# Patient Record
(demographics unavailable — no encounter records)

---

## 2024-11-12 NOTE — PHYSICAL EXAM
[Chaperone Present] : A chaperone was present in the examining room during all aspects of the physical examination [78501] : A chaperone was present during the pelvic exam. [FreeTextEntry2] : Lakshmi [Appropriately responsive] : appropriately responsive [Alert] : alert [No Acute Distress] : no acute distress [No Lymphadenopathy] : no lymphadenopathy [Regular Rate Rhythm] : regular rate rhythm [No Murmurs] : no murmurs [Clear to Auscultation B/L] : clear to auscultation bilaterally [Soft] : soft [Non-tender] : non-tender [Non-distended] : non-distended [No HSM] : No HSM [No Lesions] : no lesions [No Mass] : no mass [Oriented x3] : oriented x3 [FreeTextEntry6] : No masses, nontender, no skin changes, no nipple discharge, no adenopathy. [Examination Of The Breasts] : a normal appearance [No Masses] : no breast masses were palpable [Labia Majora] : normal [Labia Minora] : normal [Normal] : normal [Tenderness] : nontender [Anteversion] : anteverted [Mass ___ cm] : no uterine mass was palpated [Uterine Adnexae] : normal

## 2024-11-12 NOTE — HISTORY OF PRESENT ILLNESS
[FreeTextEntry1] : Patient is a 48-year-old  0 last menstrual period 2024 Patient presents for annual visit,, denies any complaints

## 2024-11-12 NOTE — PLAN
[FreeTextEntry1] : Patient is a 48-year-old  0 last menstrual period 2024 Patient presents for annual visit,, denies any complaints Physical exam reveals a well-developed well-nourished female no apparent distress,, BMI 26 Heart regular rhythm and rate, lungs clear, breast no mass nontender no skin change or nipple discharge adenopathy, abdomen soft nontender no organomegaly Pelvic exam shows normal female external genitalia, vagina no lesions, cervix appropriate size nontender, uterus anteverted normal size nontender, adnexa no mass nontender Pap smear was performed Patient will receive a prescription for breast mammogram and sonogram Essentially benign GYN exam Follow-up 1 year or prior to that as needed  Lakshmi was present as a chaperone for the entire assessment and examination of this patient

## 2025-03-21 NOTE — PLAN
[FreeTextEntry1] : Patient is a 48-year-old  0 last menstrual period 2025 Patient presents for consultation regarding her symptoms Patient states she is experiencing depression, anxiety, anger, mood swings Patient states she has been under the treatment by her psychiatrist with Zoloft, Wellbutrin, temazepam, Klonopin Discussed at length PMS and perimenopause and hormonal changes with cycles that may occur and of course hormone changes that are low when she is missing her cycle Discussed also the menopause would be if she experiences a year or longer of no cycles at all Questions answered Patient states she understands Patient advised to consult her psychiatrist to possibly add Prozac or possibly change her regiment that may include new meds to better control her symptoms Patient states she has her next appointment with her MD psychiatrist on  and will call back with his recommendations about a possible change in her regimen  Patient was seen in the office consultation and discussion there was not examined during this visit

## 2025-03-21 NOTE — HISTORY OF PRESENT ILLNESS
[FreeTextEntry1] : Patient is a 48-year-old  0 last menstrual period 2025 Patient presents for consultation Patient is experiencing increased mood swings, anxiety, depression and has been followed by psychiatrist and is on medication consistent with Zoloft, Wellbutrin, temazepam, Klonopin